# Patient Record
Sex: FEMALE | Race: WHITE | Employment: FULL TIME | URBAN - METROPOLITAN AREA
[De-identification: names, ages, dates, MRNs, and addresses within clinical notes are randomized per-mention and may not be internally consistent; named-entity substitution may affect disease eponyms.]

---

## 2017-05-24 RX ORDER — DESOGESTREL AND ETHINYL ESTRADIOL 0.15-0.03
KIT ORAL
Qty: 84 TABLET | Refills: 0 | Status: SHIPPED | OUTPATIENT
Start: 2017-05-24 | End: 2017-09-06 | Stop reason: SDUPTHER

## 2017-09-06 RX ORDER — DESOGESTREL AND ETHINYL ESTRADIOL 0.15-0.03
KIT ORAL
Qty: 84 TABLET | Refills: 0 | Status: SHIPPED | OUTPATIENT
Start: 2017-09-06 | End: 2017-09-25 | Stop reason: SDUPTHER

## 2017-09-25 ENCOUNTER — OFFICE VISIT (OUTPATIENT)
Dept: OBGYN | Age: 37
End: 2017-09-25

## 2017-09-25 VITALS
SYSTOLIC BLOOD PRESSURE: 104 MMHG | WEIGHT: 135 LBS | HEIGHT: 62 IN | DIASTOLIC BLOOD PRESSURE: 78 MMHG | BODY MASS INDEX: 24.84 KG/M2

## 2017-09-25 DIAGNOSIS — Z01.419 WELL WOMAN EXAM WITH ROUTINE GYNECOLOGICAL EXAM: Primary | ICD-10-CM

## 2017-09-25 DIAGNOSIS — Z11.51 SCREENING FOR HPV (HUMAN PAPILLOMAVIRUS): ICD-10-CM

## 2017-09-25 DIAGNOSIS — Z01.419 PAP SMEAR, AS PART OF ROUTINE GYNECOLOGICAL EXAMINATION: ICD-10-CM

## 2017-09-25 PROCEDURE — 99395 PREV VISIT EST AGE 18-39: CPT | Performed by: NURSE PRACTITIONER

## 2017-09-25 RX ORDER — DESOGESTREL AND ETHINYL ESTRADIOL 0.15-0.03
1 KIT ORAL DAILY
Qty: 84 TABLET | Refills: 3 | Status: SHIPPED | OUTPATIENT
Start: 2017-09-25 | End: 2018-09-20 | Stop reason: SDUPTHER

## 2017-10-02 DIAGNOSIS — Z11.51 SCREENING FOR HPV (HUMAN PAPILLOMAVIRUS): ICD-10-CM

## 2017-10-02 DIAGNOSIS — Z01.419 PAP SMEAR, AS PART OF ROUTINE GYNECOLOGICAL EXAMINATION: ICD-10-CM

## 2018-09-25 RX ORDER — DESOGESTREL AND ETHINYL ESTRADIOL 0.15-0.03
1 KIT ORAL DAILY
Qty: 84 TABLET | Refills: 3 | Status: SHIPPED | OUTPATIENT
Start: 2018-09-25 | End: 2018-12-06 | Stop reason: SDUPTHER

## 2018-10-16 ENCOUNTER — OFFICE VISIT (OUTPATIENT)
Dept: OBGYN CLINIC | Age: 38
End: 2018-10-16
Payer: COMMERCIAL

## 2018-10-16 VITALS
SYSTOLIC BLOOD PRESSURE: 116 MMHG | DIASTOLIC BLOOD PRESSURE: 82 MMHG | WEIGHT: 138 LBS | BODY MASS INDEX: 25.4 KG/M2 | HEIGHT: 62 IN

## 2018-10-16 DIAGNOSIS — Z12.39 BREAST CANCER SCREENING: ICD-10-CM

## 2018-10-16 DIAGNOSIS — Z01.419 WELL WOMAN EXAM WITH ROUTINE GYNECOLOGICAL EXAM: Primary | ICD-10-CM

## 2018-10-16 PROCEDURE — 99396 PREV VISIT EST AGE 40-64: CPT | Performed by: NURSE PRACTITIONER

## 2018-10-16 ASSESSMENT — PATIENT HEALTH QUESTIONNAIRE - PHQ9
2. FEELING DOWN, DEPRESSED OR HOPELESS: 0
SUM OF ALL RESPONSES TO PHQ QUESTIONS 1-9: 0
1. LITTLE INTEREST OR PLEASURE IN DOING THINGS: 0
SUM OF ALL RESPONSES TO PHQ9 QUESTIONS 1 & 2: 0
SUM OF ALL RESPONSES TO PHQ QUESTIONS 1-9: 0

## 2018-10-16 NOTE — PATIENT INSTRUCTIONS
test didn't find any abnormal cells in the sample. · An abnormal result can mean many things. Most of these aren't cancer. The results of your test may be abnormal because:  ¨ You have an infection of the vagina or cervix, such as a yeast infection. ¨ You have an IUD (intrauterine device for birth control). ¨ You have low estrogen levels after menopause that are causing the cells to change. ¨ You have cell changes that may be a sign of precancer or cancer. The results are ranked based on how serious the changes might be. HPV tests  · A normal result means that the test didn't find any high-risk HPV in the sample. · An abnormal HPV test doesn't mean that you have cervical cancer. It may mean that you are infected with one or more high-risk types of HPV. This increases your chance of having cell changes that may be a sign of precancer or cancer. Follow-up care is a key part of your treatment and safety. Be sure to make and go to all appointments, and call your doctor if you are having problems. It's also a good idea to know your test results and keep a list of the medicines you take. Where can you learn more? Go to [email or web address] org and sign in to your [employer] account. Enter P919 in the [employer] box to learn more about \"Learning About Cervical Cancer Screening. \"     If you do not have an account, please click on the \"Sign Up Now\" link. Current as of: January 31, 2018  Content Version: 11.7  © 3170-5618 [employer]. Care instructions adapted under license by [facility] [employer] [facility] ([facility]). If you have questions about a medical condition or this instruction, always ask your healthcare professional. [place] [address] any warranty or liability for your use of this information. Patient Education        Breast Self-Exam: Care Instructions  Your Care Instructions    A breast self-exam is when you check your breasts for lumps or changes.  This regular exam

## 2018-12-06 ENCOUNTER — TELEPHONE (OUTPATIENT)
Dept: OBGYN CLINIC | Age: 38
End: 2018-12-06

## 2018-12-06 RX ORDER — DESOGESTREL AND ETHINYL ESTRADIOL 0.15-0.03
1 KIT ORAL DAILY
Qty: 84 TABLET | Refills: 3 | Status: SHIPPED | OUTPATIENT
Start: 2018-12-06 | End: 2019-09-19 | Stop reason: SDUPTHER

## 2019-01-24 DIAGNOSIS — Z12.39 BREAST CANCER SCREENING: ICD-10-CM

## 2019-01-25 ENCOUNTER — TELEPHONE (OUTPATIENT)
Dept: OBGYN CLINIC | Age: 39
End: 2019-01-25

## 2019-01-25 DIAGNOSIS — R92.8 ABNORMAL MAMMOGRAM: Primary | ICD-10-CM

## 2019-06-05 ENCOUNTER — OFFICE VISIT (OUTPATIENT)
Dept: OBGYN CLINIC | Age: 39
End: 2019-06-05
Payer: COMMERCIAL

## 2019-06-05 VITALS
HEIGHT: 62 IN | DIASTOLIC BLOOD PRESSURE: 74 MMHG | BODY MASS INDEX: 24.84 KG/M2 | WEIGHT: 135 LBS | SYSTOLIC BLOOD PRESSURE: 112 MMHG

## 2019-06-05 DIAGNOSIS — L91.8 SKIN TAG: Primary | ICD-10-CM

## 2019-06-05 PROCEDURE — G8420 CALC BMI NORM PARAMETERS: HCPCS | Performed by: OBSTETRICS & GYNECOLOGY

## 2019-06-05 PROCEDURE — 1036F TOBACCO NON-USER: CPT | Performed by: OBSTETRICS & GYNECOLOGY

## 2019-06-05 PROCEDURE — G8427 DOCREV CUR MEDS BY ELIG CLIN: HCPCS | Performed by: OBSTETRICS & GYNECOLOGY

## 2019-06-05 PROCEDURE — 99213 OFFICE O/P EST LOW 20 MIN: CPT | Performed by: OBSTETRICS & GYNECOLOGY

## 2019-06-05 ASSESSMENT — ENCOUNTER SYMPTOMS
NAUSEA: 0
ALLERGIC/IMMUNOLOGIC NEGATIVE: 1
CONSTIPATION: 0
RESPIRATORY NEGATIVE: 1
DIARRHEA: 0
ABDOMINAL DISTENTION: 0
BLOOD IN STOOL: 0
VOMITING: 0
RECTAL PAIN: 0
ABDOMINAL PAIN: 0
EYES NEGATIVE: 1
ANAL BLEEDING: 0

## 2019-06-05 NOTE — PROGRESS NOTES
Patient here c/o small lump on left buttocks noted yesterday. Declines itching and burning. No pain. SA with new partner. States they are using condoms and patient is on OCP. Exam with small skin tag left buttocks. All questions answered. F/U annual exam or prn. Vitals:  /74   Ht 5' 2\" (1.575 m)   Wt 135 lb (61.2 kg)   LMP 05/22/2019   BMI 24.69 kg/m²   History reviewed. No pertinent past medical history. History reviewed. No pertinent surgical history. Allergies:  Patient has no known allergies. Current Outpatient Medications   Medication Sig Dispense Refill    desogestrel-ethinyl estradiol (ENSKYCE) 0.15-30 MG-MCG per tablet Take 1 tablet by mouth daily 84 tablet 3     No current facility-administered medications for this visit. Social History     Socioeconomic History    Marital status: Single     Spouse name: Not on file    Number of children: Not on file    Years of education: Not on file    Highest education level: Not on file   Occupational History    Not on file   Social Needs    Financial resource strain: Not on file    Food insecurity:     Worry: Not on file     Inability: Not on file    Transportation needs:     Medical: Not on file     Non-medical: Not on file   Tobacco Use    Smoking status: Former Smoker     Types: Cigarettes    Smokeless tobacco: Never Used   Substance and Sexual Activity    Alcohol use:  Yes     Alcohol/week: 0.0 oz    Drug use: No    Sexual activity: Yes     Partners: Male     Comment: New partner 2019   Lifestyle    Physical activity:     Days per week: Not on file     Minutes per session: Not on file    Stress: Not on file   Relationships    Social connections:     Talks on phone: Not on file     Gets together: Not on file     Attends Taoist service: Not on file     Active member of club or organization: Not on file     Attends meetings of clubs or organizations: Not on file     Relationship status: Not on file    Intimate partner violence:     Fear of current or ex partner: Not on file     Emotionally abused: Not on file     Physically abused: Not on file     Forced sexual activity: Not on file   Other Topics Concern    Not on file   Social History Narrative    Not on file        Family History   Problem Relation Age of Onset    Heart Attack Father     Cancer Mother     Colon Cancer Neg Hx     Diabetes Neg Hx     Eclampsia Neg Hx     Hypertension Neg Hx     Ovarian Cancer Neg Hx      Labor Neg Hx     Spont Abortions Neg Hx     Stroke Neg Hx        Review of Systems   Constitutional: Negative. Negative for activity change, appetite change, chills, diaphoresis, fatigue, fever and unexpected weight change. HENT: Negative. Eyes: Negative. Respiratory: Negative. Cardiovascular: Negative. Gastrointestinal: Negative for abdominal distention, abdominal pain, anal bleeding, blood in stool, constipation, diarrhea, nausea, rectal pain and vomiting. Endocrine: Negative. Genitourinary: Negative for decreased urine volume, difficulty urinating, dyspareunia, dysuria, enuresis, flank pain, frequency, genital sores, hematuria, menstrual problem, pelvic pain, urgency, vaginal bleeding, vaginal discharge and vaginal pain. Musculoskeletal: Negative. Skin: Negative. Allergic/Immunologic: Negative. Neurological: Negative. Hematological: Negative. Psychiatric/Behavioral: Negative. Objective:     Physical Exam   Constitutional: She is oriented to person, place, and time. She appears well-developed and well-nourished. No distress. HENT:   Head: Normocephalic and atraumatic. Eyes: Conjunctivae are normal.   Neck: Normal range of motion. Neck supple. Cardiovascular: Normal rate and regular rhythm. Pulmonary/Chest: Effort normal. No respiratory distress. Genitourinary:       No labial fusion. There is no rash, tenderness, lesion or injury on the right labia.  There is no rash, tenderness, lesion or injury on the left labia. Musculoskeletal: Normal range of motion. She exhibits no edema, tenderness or deformity. Neurological: She is alert and oriented to person, place, and time. She exhibits normal muscle tone. Coordination normal.   Skin: Skin is warm and dry. She is not diaphoretic. No pallor. Psychiatric: She has a normal mood and affect. Her behavior is normal. Judgment and thought content normal.       Assessment:          Diagnosis Orders   1. Skin tag          Plan:      Medications placedthis encounter:  No orders of the defined types were placed in this encounter. Orders placedthis encounter:  No orders of the defined types were placed in this encounter.         Follow up:  Return for Annual.

## 2019-09-19 RX ORDER — DESOGESTREL AND ETHINYL ESTRADIOL 0.15-0.03
1 KIT ORAL DAILY
Qty: 84 TABLET | Refills: 0 | Status: SHIPPED | OUTPATIENT
Start: 2019-09-19 | End: 2019-10-16 | Stop reason: SDUPTHER

## 2019-10-16 ENCOUNTER — OFFICE VISIT (OUTPATIENT)
Dept: OBGYN CLINIC | Age: 39
End: 2019-10-16
Payer: COMMERCIAL

## 2019-10-16 VITALS
DIASTOLIC BLOOD PRESSURE: 72 MMHG | WEIGHT: 136 LBS | SYSTOLIC BLOOD PRESSURE: 110 MMHG | HEIGHT: 62 IN | BODY MASS INDEX: 25.03 KG/M2

## 2019-10-16 DIAGNOSIS — Z01.419 WOMEN'S ANNUAL ROUTINE GYNECOLOGICAL EXAMINATION: Primary | ICD-10-CM

## 2019-10-16 DIAGNOSIS — Z11.51 SCREENING FOR HUMAN PAPILLOMAVIRUS: ICD-10-CM

## 2019-10-16 DIAGNOSIS — Z12.31 SCREENING MAMMOGRAM, ENCOUNTER FOR: ICD-10-CM

## 2019-10-16 PROCEDURE — 99396 PREV VISIT EST AGE 40-64: CPT | Performed by: OBSTETRICS & GYNECOLOGY

## 2019-10-16 PROCEDURE — G8484 FLU IMMUNIZE NO ADMIN: HCPCS | Performed by: OBSTETRICS & GYNECOLOGY

## 2019-10-16 RX ORDER — DESOGESTREL AND ETHINYL ESTRADIOL 0.15-0.03
1 KIT ORAL DAILY
Qty: 84 TABLET | Refills: 4 | Status: SHIPPED | OUTPATIENT
Start: 2019-10-16 | End: 2020-10-15 | Stop reason: SDUPTHER

## 2019-10-16 ASSESSMENT — ENCOUNTER SYMPTOMS
NAUSEA: 0
ANAL BLEEDING: 0
ABDOMINAL DISTENTION: 0
DIARRHEA: 0
RESPIRATORY NEGATIVE: 1
RECTAL PAIN: 0
BLOOD IN STOOL: 0
CONSTIPATION: 0
ABDOMINAL PAIN: 0
EYES NEGATIVE: 1
ALLERGIC/IMMUNOLOGIC NEGATIVE: 1
VOMITING: 0

## 2019-10-30 DIAGNOSIS — Z01.419 WOMEN'S ANNUAL ROUTINE GYNECOLOGICAL EXAMINATION: ICD-10-CM

## 2019-10-30 DIAGNOSIS — Z11.51 SCREENING FOR HUMAN PAPILLOMAVIRUS: ICD-10-CM

## 2019-10-30 DIAGNOSIS — Z12.31 SCREENING MAMMOGRAM, ENCOUNTER FOR: ICD-10-CM

## 2019-10-30 DIAGNOSIS — R92.8 ABNORMAL MAMMOGRAM: ICD-10-CM

## 2019-12-17 ENCOUNTER — TELEPHONE (OUTPATIENT)
Dept: ADMINISTRATIVE | Age: 39
End: 2019-12-17

## 2020-10-15 ENCOUNTER — OFFICE VISIT (OUTPATIENT)
Dept: OBGYN CLINIC | Age: 40
End: 2020-10-15
Payer: COMMERCIAL

## 2020-10-15 VITALS
SYSTOLIC BLOOD PRESSURE: 118 MMHG | WEIGHT: 142 LBS | HEIGHT: 62 IN | DIASTOLIC BLOOD PRESSURE: 72 MMHG | BODY MASS INDEX: 26.13 KG/M2

## 2020-10-15 PROCEDURE — 99396 PREV VISIT EST AGE 40-64: CPT | Performed by: OBSTETRICS & GYNECOLOGY

## 2020-10-15 PROCEDURE — G8484 FLU IMMUNIZE NO ADMIN: HCPCS | Performed by: OBSTETRICS & GYNECOLOGY

## 2020-10-15 RX ORDER — DESOGESTREL AND ETHINYL ESTRADIOL 0.15-0.03
1 KIT ORAL DAILY
Qty: 84 TABLET | Refills: 4 | Status: SHIPPED | OUTPATIENT
Start: 2020-10-15 | End: 2021-10-20 | Stop reason: SDUPTHER

## 2020-10-15 ASSESSMENT — PATIENT HEALTH QUESTIONNAIRE - PHQ9
2. FEELING DOWN, DEPRESSED OR HOPELESS: 0
1. LITTLE INTEREST OR PLEASURE IN DOING THINGS: 0
SUM OF ALL RESPONSES TO PHQ QUESTIONS 1-9: 0
SUM OF ALL RESPONSES TO PHQ QUESTIONS 1-9: 0
SUM OF ALL RESPONSES TO PHQ9 QUESTIONS 1 & 2: 0
SUM OF ALL RESPONSES TO PHQ QUESTIONS 1-9: 0

## 2020-10-15 ASSESSMENT — ENCOUNTER SYMPTOMS
NAUSEA: 0
CONSTIPATION: 0
ABDOMINAL DISTENTION: 0
DIARRHEA: 0
ABDOMINAL PAIN: 0
RECTAL PAIN: 0
ALLERGIC/IMMUNOLOGIC NEGATIVE: 1
ANAL BLEEDING: 0
EYES NEGATIVE: 1
BLOOD IN STOOL: 0
RESPIRATORY NEGATIVE: 1
VOMITING: 0

## 2020-10-15 ASSESSMENT — VISUAL ACUITY: OU: 1

## 2020-10-15 NOTE — PROGRESS NOTES
Subjective:      Patient ID: Pam Almeida is a 41 y.o. female    Annual exam.  No GYN complaints. Normal cycles. Pap performed and screening mammogram ordered. STD screening offered. Monthly SBE encouraged. F/U annual or prn. Vitals:  /72   Ht 5' 2\" (1.575 m)   Wt 142 lb (64.4 kg)   LMP 09/29/2020   BMI 25.97 kg/m²   History reviewed. No pertinent past medical history. History reviewed. No pertinent surgical history. Allergies:  Patient has no known allergies. Current Outpatient Medications   Medication Sig Dispense Refill    desogestrel-ethinyl estradiol (ENSKYCE) 0.15-30 MG-MCG per tablet Take 1 tablet by mouth daily 84 tablet 4     No current facility-administered medications for this visit. Social History     Socioeconomic History    Marital status: Single     Spouse name: Not on file    Number of children: Not on file    Years of education: Not on file    Highest education level: Not on file   Occupational History    Not on file   Social Needs    Financial resource strain: Not on file    Food insecurity     Worry: Not on file     Inability: Not on file    Transportation needs     Medical: Not on file     Non-medical: Not on file   Tobacco Use    Smoking status: Former Smoker     Types: Cigarettes    Smokeless tobacco: Never Used   Substance and Sexual Activity    Alcohol use:  Yes     Alcohol/week: 0.0 standard drinks    Drug use: No    Sexual activity: Yes     Partners: Male     Birth control/protection: Pill     Comment: New partner 2019   Lifestyle    Physical activity     Days per week: Not on file     Minutes per session: Not on file    Stress: Not on file   Relationships    Social connections     Talks on phone: Not on file     Gets together: Not on file     Attends Amish service: Not on file     Active member of club or organization: Not on file     Attends meetings of clubs or organizations: Not on file     Relationship status: Not on file   [facility] Intimate partner violence     Fear of current or ex partner: Not on file     Emotionally abused: Not on file     Physically abused: Not on file     Forced sexual activity: Not on file   Other Topics Concern    Not on file   Social History Narrative    Not on file     Family History   Problem Relation Age of Onset    Heart Attack Father     Cancer Mother     Colon Cancer Neg Hx     Diabetes Neg Hx     Eclampsia Neg Hx     Hypertension Neg Hx     Ovarian Cancer Neg Hx      Labor Neg Hx     Spont Abortions Neg Hx     Stroke Neg Hx        Review of Systems   Constitutional: Negative. Negative for activity change, appetite change, chills, diaphoresis, fatigue, fever and unexpected weight change. HENT: Negative. Eyes: Negative. Respiratory: Negative. Cardiovascular: Negative. Gastrointestinal: Negative for abdominal distention, abdominal pain, anal bleeding, blood in stool, constipation, diarrhea, nausea, rectal pain and vomiting. Endocrine: Negative. Genitourinary: Negative for decreased urine volume, difficulty urinating, dyspareunia, dysuria, enuresis, flank pain, frequency, genital sores, hematuria, menstrual problem, pelvic pain, urgency, vaginal bleeding, vaginal discharge and vaginal pain. Musculoskeletal: Negative. Skin: Negative. Allergic/Immunologic: Negative. Neurological: Negative. Hematological: Negative. Psychiatric/Behavioral: Negative. Objective:     Physical Exam  Constitutional:       Appearance: She is well-developed. HENT:      Head: Normocephalic. Eyes:      General: Lids are normal. Vision grossly intact. Neck:      Musculoskeletal: Normal range of motion and neck supple. Thyroid: No thyromegaly. Cardiovascular:      Rate and Rhythm: Normal rate and regular rhythm. Heart sounds: Normal heart sounds. Pulmonary:      Effort: Pulmonary effort is normal. No respiratory distress. Breath sounds: Normal breath sounds.  No wheezing or rales. Chest:      Chest wall: No tenderness. Breasts:         Right: Normal. No swelling, bleeding, inverted nipple, mass, nipple discharge, skin change or tenderness. Left: Normal. No swelling, bleeding, inverted nipple, mass, nipple discharge, skin change or tenderness. Abdominal:      General: There is no distension. Palpations: Abdomen is soft. There is no mass. Tenderness: There is no abdominal tenderness. There is no guarding or rebound. Hernia: No hernia is present. There is no hernia in the left inguinal area or right inguinal area. Genitourinary:     General: Normal vulva. Pubic Area: No rash. Labia:         Right: No rash, tenderness, lesion or injury. Left: No rash, tenderness, lesion or injury. Urethra: No prolapse, urethral swelling or urethral lesion. Vagina: Normal. No signs of injury and foreign body. No vaginal discharge, erythema, tenderness or bleeding. Cervix: No cervical motion tenderness, discharge or friability. Uterus: Not deviated, not enlarged, not fixed and not tender. Adnexa:         Right: No mass, tenderness or fullness. Left: No mass, tenderness or fullness. Rectum: Normal.   Musculoskeletal: Normal range of motion. General: No tenderness. Lymphadenopathy:      Cervical: No cervical adenopathy. Upper Body:      Right upper body: No supraclavicular or axillary adenopathy. Left upper body: No supraclavicular or axillary adenopathy. Lower Body: No right inguinal adenopathy. No left inguinal adenopathy. Skin:     General: Skin is warm and dry. Coloration: Skin is not pale. Findings: No erythema or rash. Neurological:      Mental Status: She is alert and oriented to person, place, and time. Psychiatric:         Behavior: Behavior normal.         Thought Content:  Thought content normal.         Judgment: Judgment normal.         Assessment: Diagnosis Orders   1. Women's annual routine gynecological examination  PAP SMEAR   2. Screening for human papillomavirus  PAP SMEAR   3. Screening mammogram, encounter for  KAROL DIGITAL SCREEN W OR WO CAD BILATERAL         Plan:      Medications placedthis encounter:  Orders Placed This Encounter   Medications    desogestrel-ethinyl estradiol (ENSKYCE) 0.15-30 MG-MCG per tablet     Sig: Take 1 tablet by mouth daily     Dispense:  84 tablet     Refill:  4     Last refill until patient comes in for appointment. Orders placedthis encounter:  Orders Placed This Encounter   Procedures    KAROL DIGITAL SCREEN W OR WO CAD BILATERAL     Standing Status:   Future     Standing Expiration Date:   10/15/2021    PAP SMEAR     Standing Status:   Future     Standing Expiration Date:   10/15/2021     Order Specific Question:   Collection Type     Answer: Thin Prep     Order Specific Question:   Prior Abnormal Pap Test     Answer:   No     Order Specific Question:   Screening or Diagnostic     Answer:   Screening     Order Specific Question:   HPV Requested? Answer:   Yes     Order Specific Question:   High Risk Patient     Answer:   N/A         Follow up:  Return in about 1 year (around 10/15/2021) for Annual.   Repeat Annual GYN exam every 1 year. Cervical Cytology exam starts age 24. If Negative Cytology;  follow-up screening per current guidelines. Mammograms yearly starting at age 36. Calcium and Vitamin D dosing reviewed ( age appropriate ). Colonoscopy and bone density screening discussed ( age appropriate ). Birth control and STD prevention discussed ( age appropriate ). Gardisil counseling completed for all patients 7-35 yo. Routine health maintenance ( per PCP and guidelines ). .The patient was asked if she would like a chaperone present for her intimate exam. She  declines the chaperone.  Jameel Suarez

## 2021-04-21 DIAGNOSIS — Z12.31 SCREENING MAMMOGRAM, ENCOUNTER FOR: ICD-10-CM

## 2021-10-20 ENCOUNTER — OFFICE VISIT (OUTPATIENT)
Dept: OBGYN CLINIC | Age: 41
End: 2021-10-20
Payer: COMMERCIAL

## 2021-10-20 VITALS
WEIGHT: 138 LBS | HEIGHT: 62 IN | DIASTOLIC BLOOD PRESSURE: 68 MMHG | SYSTOLIC BLOOD PRESSURE: 112 MMHG | BODY MASS INDEX: 25.4 KG/M2

## 2021-10-20 DIAGNOSIS — Z76.89 ENCOUNTER TO ESTABLISH CARE: ICD-10-CM

## 2021-10-20 DIAGNOSIS — Z12.31 SCREENING MAMMOGRAM, ENCOUNTER FOR: ICD-10-CM

## 2021-10-20 DIAGNOSIS — Z01.419 WOMEN'S ANNUAL ROUTINE GYNECOLOGICAL EXAMINATION: Primary | ICD-10-CM

## 2021-10-20 DIAGNOSIS — Z11.51 SCREENING FOR HUMAN PAPILLOMAVIRUS: ICD-10-CM

## 2021-10-20 PROCEDURE — G8484 FLU IMMUNIZE NO ADMIN: HCPCS | Performed by: OBSTETRICS & GYNECOLOGY

## 2021-10-20 PROCEDURE — 99396 PREV VISIT EST AGE 40-64: CPT | Performed by: OBSTETRICS & GYNECOLOGY

## 2021-10-20 RX ORDER — DESOGESTREL AND ETHINYL ESTRADIOL 0.15-0.03
1 KIT ORAL DAILY
Qty: 84 TABLET | Refills: 4 | Status: SHIPPED | OUTPATIENT
Start: 2021-10-20 | End: 2022-10-20 | Stop reason: SDUPTHER

## 2021-10-20 ASSESSMENT — ENCOUNTER SYMPTOMS
RECTAL PAIN: 0
DIARRHEA: 0
RESPIRATORY NEGATIVE: 1
ANAL BLEEDING: 0
BLOOD IN STOOL: 0
VOMITING: 0
NAUSEA: 0
EYES NEGATIVE: 1
ABDOMINAL DISTENTION: 0
ALLERGIC/IMMUNOLOGIC NEGATIVE: 1
ABDOMINAL PAIN: 0
CONSTIPATION: 0

## 2021-10-20 ASSESSMENT — PATIENT HEALTH QUESTIONNAIRE - PHQ9
SUM OF ALL RESPONSES TO PHQ QUESTIONS 1-9: 0
1. LITTLE INTEREST OR PLEASURE IN DOING THINGS: 0
SUM OF ALL RESPONSES TO PHQ QUESTIONS 1-9: 0
SUM OF ALL RESPONSES TO PHQ QUESTIONS 1-9: 0
2. FEELING DOWN, DEPRESSED OR HOPELESS: 0
SUM OF ALL RESPONSES TO PHQ9 QUESTIONS 1 & 2: 0

## 2021-10-20 NOTE — PROGRESS NOTES
Subjective:      Patient ID: Gary Mcconnell is a 35 y.o. female    Annual exam.  No GYN complaints. Normal cycles on OCP. Pap performed and screening mammogram ordered. STD screening offered. Monthly SBE encouraged. F/U annual or prn. Vitals:  /68   Ht 5' 2\" (1.575 m)   Wt 138 lb (62.6 kg)   LMP 10/20/2021   BMI 25.24 kg/m²   No past medical history on file. No past surgical history on file. Allergies:  Patient has no known allergies. Current Outpatient Medications   Medication Sig Dispense Refill    desogestrel-ethinyl estradiol (ENSKYCE) 0.15-30 MG-MCG per tablet Take 1 tablet by mouth daily 84 tablet 4     No current facility-administered medications for this visit. Social History     Socioeconomic History    Marital status: Single     Spouse name: Not on file    Number of children: Not on file    Years of education: Not on file    Highest education level: Not on file   Occupational History    Not on file   Tobacco Use    Smoking status: Former Smoker     Types: Cigarettes    Smokeless tobacco: Never Used   Substance and Sexual Activity    Alcohol use: Yes     Alcohol/week: 0.0 standard drinks    Drug use: No    Sexual activity: Yes     Partners: Male     Birth control/protection: Pill     Comment: New partner 2019   Other Topics Concern    Not on file   Social History Narrative    Not on file     Social Determinants of Health     Financial Resource Strain:     Difficulty of Paying Living Expenses:    Food Insecurity:     Worried About Running Out of Food in the Last Year:     [address] in the Last Year:    Transportation Needs:     Lack of Transportation (Medical):      Lack of Transportation (Non-Medical):    Physical Activity:     Days of Exercise per Week:     Minutes of Exercise per Session:    Stress:     Feeling of Stress :    Social Connections:     Frequency of Communication with Friends and Family:     Frequency of Social Gatherings with Friends and Family:     Attends Sikhism Services:     Active Member of Clubs or Organizations:     Attends Club or Organization Meetings:     Marital Status:    Intimate Partner Violence:     Fear of Current or Ex-Partner:     Emotionally Abused:     Physically Abused:     Sexually Abused:      Family History   Problem Relation Age of Onset    Heart Attack Father     Cancer Mother     Colon Cancer Neg Hx     Diabetes Neg Hx     Eclampsia Neg Hx     Hypertension Neg Hx     Ovarian Cancer Neg Hx      Labor Neg Hx     Spont Abortions Neg Hx     Stroke Neg Hx        Review of Systems   Constitutional: Negative. Negative for activity change, appetite change, chills, diaphoresis, fatigue, fever and unexpected weight change. HENT: Negative. Eyes: Negative. Respiratory: Negative. Cardiovascular: Negative. Gastrointestinal: Negative for abdominal distention, abdominal pain, anal bleeding, blood in stool, constipation, diarrhea, nausea, rectal pain and vomiting. Endocrine: Negative. Genitourinary: Negative for decreased urine volume, difficulty urinating, dyspareunia, dysuria, enuresis, flank pain, frequency, genital sores, hematuria, menstrual problem, pelvic pain, urgency, vaginal bleeding, vaginal discharge and vaginal pain. Musculoskeletal: Negative. Skin: Negative. Allergic/Immunologic: Negative. Neurological: Negative. Hematological: Negative. Psychiatric/Behavioral: Negative. Objective:     Physical Exam  Constitutional:       Appearance: She is well-developed. HENT:      Head: Normocephalic. Neck:      Thyroid: No thyromegaly. Cardiovascular:      Rate and Rhythm: Normal rate and regular rhythm. Heart sounds: Normal heart sounds. Pulmonary:      Effort: Pulmonary effort is normal. No respiratory distress. Breath sounds: Normal breath sounds. No wheezing or rales. Chest:      Chest wall: No tenderness.       Breasts: Right: Normal. No swelling, bleeding, inverted nipple, mass, nipple discharge, skin change or tenderness. Left: Normal. No swelling, bleeding, inverted nipple, mass, nipple discharge, skin change or tenderness. Abdominal:      General: There is no distension. Palpations: Abdomen is soft. There is no mass. Tenderness: There is no abdominal tenderness. There is no guarding or rebound. Hernia: There is no hernia in the umbilical area, ventral area, left inguinal area or right inguinal area. Genitourinary:     Labia:         Right: No rash, tenderness, lesion or injury. Left: No rash, tenderness, lesion or injury. Urethra: No prolapse, urethral pain, urethral swelling or urethral lesion. Vagina: Normal. No signs of injury and foreign body. No vaginal discharge, erythema, tenderness or bleeding. Adnexa: Right adnexa normal and left adnexa normal.        Right: No mass, tenderness or fullness. Left: No mass, tenderness or fullness. Rectum: Normal.      Comments: S/P SUE    Musculoskeletal:         General: No tenderness. Normal range of motion. Cervical back: Normal range of motion and neck supple. Lymphadenopathy:      Cervical: No cervical adenopathy. Upper Body:      Right upper body: No supraclavicular or axillary adenopathy. Left upper body: No supraclavicular or axillary adenopathy. Skin:     General: Skin is warm and dry. Coloration: Skin is not pale. Findings: No erythema or rash. Neurological:      Mental Status: She is alert and oriented to person, place, and time. Psychiatric:         Behavior: Behavior normal.         Thought Content: Thought content normal.         Judgment: Judgment normal.         Assessment:      Diagnosis Orders   1. Women's annual routine gynecological examination  PAP SMEAR   2. Screening for human papillomavirus  PAP SMEAR   3.  Screening mammogram, encounter for  KAROL DIGITAL SCREEN W OR WO CAD BILATERAL         Plan:      Medications placedthis encounter:  Orders Placed This Encounter   Medications    desogestrel-ethinyl estradiol (ENSKYCE) 0.15-30 MG-MCG per tablet     Sig: Take 1 tablet by mouth daily     Dispense:  84 tablet     Refill:  4     Last refill until patient comes in for appointment. Orders placedthis encounter:  Orders Placed This Encounter   Procedures    KAROL DIGITAL SCREEN W OR WO CAD BILATERAL     Standing Status:   Future     Standing Expiration Date:   10/20/2022    PAP SMEAR     Standing Status:   Future     Standing Expiration Date:   10/20/2022     Order Specific Question:   Collection Type     Answer: Thin Prep     Order Specific Question:   Prior Abnormal Pap Test     Answer:   No     Order Specific Question:   Screening or Diagnostic     Answer:   Screening     Order Specific Question:   HPV Requested? Answer:   Yes     Order Specific Question:   High Risk Patient     Answer:   N/A         Follow up:  Return in about 1 year (around 10/20/2022) for Annual.   Repeat Annual GYN exam every 1 year. Cervical Cytology exam starts age 24. If Negative Cytology;  follow-up screening per current guidelines. Mammograms yearly starting at age 36. Calcium and Vitamin D dosing reviewed ( age appropriate ). Colonoscopy and bone density screening discussed ( age appropriate ). Birth control and STD prevention discussed ( age appropriate ). Gardisil counseling completed for all patients ( age appropriate ). Routine health maintenance ( per PCP and guidelines ).

## 2021-10-28 DIAGNOSIS — Z01.419 WOMEN'S ANNUAL ROUTINE GYNECOLOGICAL EXAMINATION: ICD-10-CM

## 2021-10-28 DIAGNOSIS — Z11.51 SCREENING FOR HUMAN PAPILLOMAVIRUS: ICD-10-CM

## 2022-06-22 DIAGNOSIS — Z12.31 SCREENING MAMMOGRAM, ENCOUNTER FOR: ICD-10-CM

## 2022-10-20 ENCOUNTER — TELEPHONE (OUTPATIENT)
Dept: OBGYN CLINIC | Age: 42
End: 2022-10-20

## 2022-10-20 ENCOUNTER — OFFICE VISIT (OUTPATIENT)
Dept: OBGYN CLINIC | Age: 42
End: 2022-10-20
Payer: COMMERCIAL

## 2022-10-20 VITALS
SYSTOLIC BLOOD PRESSURE: 102 MMHG | DIASTOLIC BLOOD PRESSURE: 70 MMHG | BODY MASS INDEX: 24.48 KG/M2 | WEIGHT: 133 LBS | HEIGHT: 62 IN

## 2022-10-20 DIAGNOSIS — B96.89 BV (BACTERIAL VAGINOSIS): ICD-10-CM

## 2022-10-20 DIAGNOSIS — N76.0 BV (BACTERIAL VAGINOSIS): ICD-10-CM

## 2022-10-20 DIAGNOSIS — Z12.31 SCREENING MAMMOGRAM FOR BREAST CANCER: ICD-10-CM

## 2022-10-20 DIAGNOSIS — Z01.419 ENCOUNTER FOR WELL WOMAN EXAM WITH ROUTINE GYNECOLOGICAL EXAM: Primary | ICD-10-CM

## 2022-10-20 DIAGNOSIS — N89.8 VAGINAL DISCHARGE: ICD-10-CM

## 2022-10-20 PROCEDURE — G8420 CALC BMI NORM PARAMETERS: HCPCS | Performed by: OBSTETRICS & GYNECOLOGY

## 2022-10-20 PROCEDURE — G8427 DOCREV CUR MEDS BY ELIG CLIN: HCPCS | Performed by: OBSTETRICS & GYNECOLOGY

## 2022-10-20 PROCEDURE — 99396 PREV VISIT EST AGE 40-64: CPT | Performed by: OBSTETRICS & GYNECOLOGY

## 2022-10-20 PROCEDURE — G8484 FLU IMMUNIZE NO ADMIN: HCPCS | Performed by: OBSTETRICS & GYNECOLOGY

## 2022-10-20 PROCEDURE — 1036F TOBACCO NON-USER: CPT | Performed by: OBSTETRICS & GYNECOLOGY

## 2022-10-20 PROCEDURE — 99212 OFFICE O/P EST SF 10 MIN: CPT | Performed by: OBSTETRICS & GYNECOLOGY

## 2022-10-20 RX ORDER — METRONIDAZOLE 500 MG/1
500 TABLET ORAL 2 TIMES DAILY
Qty: 14 TABLET | Refills: 0 | Status: SHIPPED | OUTPATIENT
Start: 2022-10-20 | End: 2022-10-27

## 2022-10-20 RX ORDER — FLUCONAZOLE 150 MG/1
150 TABLET ORAL ONCE
Qty: 1 TABLET | Refills: 1 | Status: SHIPPED | OUTPATIENT
Start: 2022-10-20 | End: 2022-10-20

## 2022-10-20 RX ORDER — DESOGESTREL AND ETHINYL ESTRADIOL 0.15-0.03
1 KIT ORAL DAILY
Qty: 84 TABLET | Refills: 4 | Status: SHIPPED | OUTPATIENT
Start: 2022-10-20

## 2022-10-20 ASSESSMENT — PATIENT HEALTH QUESTIONNAIRE - PHQ9
SUM OF ALL RESPONSES TO PHQ9 QUESTIONS 1 & 2: 0
SUM OF ALL RESPONSES TO PHQ QUESTIONS 1-9: 0
1. LITTLE INTEREST OR PLEASURE IN DOING THINGS: 0
SUM OF ALL RESPONSES TO PHQ QUESTIONS 1-9: 0
SUM OF ALL RESPONSES TO PHQ QUESTIONS 1-9: 0
2. FEELING DOWN, DEPRESSED OR HOPELESS: 0
SUM OF ALL RESPONSES TO PHQ QUESTIONS 1-9: 0

## 2022-10-20 ASSESSMENT — ENCOUNTER SYMPTOMS
ABDOMINAL PAIN: 0
RESPIRATORY NEGATIVE: 1
DIARRHEA: 0
BLOOD IN STOOL: 0
VOMITING: 0
EYES NEGATIVE: 1
CONSTIPATION: 0
ALLERGIC/IMMUNOLOGIC NEGATIVE: 1
ANAL BLEEDING: 0
ABDOMINAL DISTENTION: 0
NAUSEA: 0
RECTAL PAIN: 0

## 2022-10-20 ASSESSMENT — VISUAL ACUITY: OU: 1

## 2022-10-20 NOTE — PROGRESS NOTES
The patient was asked if she would like a chaperone present for her intimate exam. She  Declined the chaperone.  Rosi Carmichael CMA ([address] [place] [address])

## 2022-10-20 NOTE — TELEPHONE ENCOUNTER
Received call from patient stating script for yeast not received,if script may be sent to [facility] [address] [place] [address].

## 2022-11-02 DIAGNOSIS — N76.0 BV (BACTERIAL VAGINOSIS): ICD-10-CM

## 2022-11-02 DIAGNOSIS — B96.89 BV (BACTERIAL VAGINOSIS): ICD-10-CM

## 2022-11-02 DIAGNOSIS — N89.8 VAGINAL DISCHARGE: ICD-10-CM

## 2022-11-04 ENCOUNTER — TELEPHONE (OUTPATIENT)
Dept: OBGYN CLINIC | Age: 42
End: 2022-11-04

## 2022-11-04 NOTE — TELEPHONE ENCOUNTER
----- Message from Nica Green MD sent at 11/2/2022  3:04 PM EDT -----  + BV. Flagyl 500 mg po bid x 7 days. Recommend lactobacillus probiotic daily.

## 2023-04-14 ENCOUNTER — TELEPHONE (OUTPATIENT)
Dept: PRIMARY CARE | Facility: CLINIC | Age: 43
End: 2023-04-14
Payer: COMMERCIAL

## 2023-04-14 NOTE — TELEPHONE ENCOUNTER
PATIENT CALLED AND [facility] IS NOT TAKING NEW PATIENTS BUT [facility] IN [place] ON [address]    PLEASE ADVISE

## 2023-04-14 NOTE — TELEPHONE ENCOUNTER
Patient is requesting a referral to see Dr.Sharon Cat/ Gastroenterologist-- she is not sure what she has but she says its either a hemorrhoid or a skin tag and her insurance requires a referral      Pls adv   P. [phone]

## 2023-04-15 DIAGNOSIS — K62.9 RECTAL LESION: Primary | ICD-10-CM

## 2023-05-03 ENCOUNTER — OFFICE VISIT (OUTPATIENT)
Dept: PRIMARY CARE | Facility: CLINIC | Age: 43
End: 2023-05-03
Payer: COMMERCIAL

## 2023-05-03 ENCOUNTER — LAB (OUTPATIENT)
Dept: LAB | Facility: LAB | Age: 43
End: 2023-05-03
Payer: COMMERCIAL

## 2023-05-03 VITALS
SYSTOLIC BLOOD PRESSURE: 122 MMHG | TEMPERATURE: 97.5 F | HEART RATE: 65 BPM | DIASTOLIC BLOOD PRESSURE: 72 MMHG | WEIGHT: 140.6 LBS | HEIGHT: 62 IN | OXYGEN SATURATION: 98 % | BODY MASS INDEX: 25.88 KG/M2

## 2023-05-03 DIAGNOSIS — E66.3 OVERWEIGHT WITH BODY MASS INDEX (BMI) OF 25 TO 25.9 IN ADULT: ICD-10-CM

## 2023-05-03 DIAGNOSIS — R53.83 FATIGUE, UNSPECIFIED TYPE: ICD-10-CM

## 2023-05-03 DIAGNOSIS — Z00.00 HEALTHCARE MAINTENANCE: Primary | ICD-10-CM

## 2023-05-03 DIAGNOSIS — Z13.220 LIPID SCREENING: ICD-10-CM

## 2023-05-03 DIAGNOSIS — R73.9 HYPERGLYCEMIA: ICD-10-CM

## 2023-05-03 DIAGNOSIS — Z00.00 HEALTHCARE MAINTENANCE: ICD-10-CM

## 2023-05-03 PROBLEM — M20.12 ACQUIRED HALLUX VALGUS OF LEFT FOOT: Status: ACTIVE | Noted: 2018-09-12

## 2023-05-03 PROBLEM — S90.122A CONTUSION OF LESSER TOE OF LEFT FOOT WITHOUT DAMAGE TO NAIL: Status: RESOLVED | Noted: 2018-09-12 | Resolved: 2023-05-03

## 2023-05-03 PROBLEM — M21.619 BUNION: Status: ACTIVE | Noted: 2018-09-12

## 2023-05-03 PROBLEM — M20.5X2: Status: ACTIVE | Noted: 2018-09-12

## 2023-05-03 PROBLEM — L81.2 FRECKLED SKIN: Status: ACTIVE | Noted: 2023-05-03

## 2023-05-03 PROBLEM — D22.9 MULTIPLE PIGMENTED NEVI: Status: ACTIVE | Noted: 2023-05-03

## 2023-05-03 LAB
ALANINE AMINOTRANSFERASE (SGPT) (U/L) IN SER/PLAS: 10 U/L (ref 7–45)
ALBUMIN (G/DL) IN SER/PLAS: 4.1 G/DL (ref 3.4–5)
ALKALINE PHOSPHATASE (U/L) IN SER/PLAS: 29 U/L (ref 33–110)
ANION GAP IN SER/PLAS: 13 MMOL/L (ref 10–20)
ASPARTATE AMINOTRANSFERASE (SGOT) (U/L) IN SER/PLAS: 16 U/L (ref 9–39)
BASOPHILS (10*3/UL) IN BLOOD BY AUTOMATED COUNT: 0.04 X10E9/L (ref 0–0.1)
BASOPHILS/100 LEUKOCYTES IN BLOOD BY AUTOMATED COUNT: 0.8 % (ref 0–2)
BILIRUBIN TOTAL (MG/DL) IN SER/PLAS: 0.4 MG/DL (ref 0–1.2)
CALCIUM (MG/DL) IN SER/PLAS: 9.3 MG/DL (ref 8.6–10.3)
CARBON DIOXIDE, TOTAL (MMOL/L) IN SER/PLAS: 25 MMOL/L (ref 21–32)
CHLORIDE (MMOL/L) IN SER/PLAS: 106 MMOL/L (ref 98–107)
CHOLESTEROL (MG/DL) IN SER/PLAS: 193 MG/DL (ref 0–199)
CHOLESTEROL IN HDL (MG/DL) IN SER/PLAS: 86.1 MG/DL
CHOLESTEROL/HDL RATIO: 2.2
CREATININE (MG/DL) IN SER/PLAS: 0.82 MG/DL (ref 0.5–1.05)
EOSINOPHILS (10*3/UL) IN BLOOD BY AUTOMATED COUNT: 0.33 X10E9/L (ref 0–0.7)
EOSINOPHILS/100 LEUKOCYTES IN BLOOD BY AUTOMATED COUNT: 6.7 % (ref 0–6)
ERYTHROCYTE DISTRIBUTION WIDTH (RATIO) BY AUTOMATED COUNT: 12.9 % (ref 11.5–14.5)
ERYTHROCYTE MEAN CORPUSCULAR HEMOGLOBIN CONCENTRATION (G/DL) BY AUTOMATED: 33.2 G/DL (ref 32–36)
ERYTHROCYTE MEAN CORPUSCULAR VOLUME (FL) BY AUTOMATED COUNT: 92 FL (ref 80–100)
ERYTHROCYTES (10*6/UL) IN BLOOD BY AUTOMATED COUNT: 4.06 X10E12/L (ref 4–5.2)
GFR FEMALE: 90 ML/MIN/1.73M2
GLUCOSE (MG/DL) IN SER/PLAS: 102 MG/DL (ref 74–99)
HEMATOCRIT (%) IN BLOOD BY AUTOMATED COUNT: 37.3 % (ref 36–46)
HEMOGLOBIN (G/DL) IN BLOOD: 12.4 G/DL (ref 12–16)
IMMATURE GRANULOCYTES/100 LEUKOCYTES IN BLOOD BY AUTOMATED COUNT: 0.2 % (ref 0–0.9)
LDL: 88 MG/DL (ref 0–99)
LEUKOCYTES (10*3/UL) IN BLOOD BY AUTOMATED COUNT: 4.9 X10E9/L (ref 4.4–11.3)
LYMPHOCYTES (10*3/UL) IN BLOOD BY AUTOMATED COUNT: 1.67 X10E9/L (ref 1.2–4.8)
LYMPHOCYTES/100 LEUKOCYTES IN BLOOD BY AUTOMATED COUNT: 33.8 % (ref 13–44)
MONOCYTES (10*3/UL) IN BLOOD BY AUTOMATED COUNT: 0.28 X10E9/L (ref 0.1–1)
MONOCYTES/100 LEUKOCYTES IN BLOOD BY AUTOMATED COUNT: 5.7 % (ref 2–10)
NEUTROPHILS (10*3/UL) IN BLOOD BY AUTOMATED COUNT: 2.61 X10E9/L (ref 1.2–7.7)
NEUTROPHILS/100 LEUKOCYTES IN BLOOD BY AUTOMATED COUNT: 52.8 % (ref 40–80)
PLATELETS (10*3/UL) IN BLOOD AUTOMATED COUNT: 277 X10E9/L (ref 150–450)
POTASSIUM (MMOL/L) IN SER/PLAS: 4.5 MMOL/L (ref 3.5–5.3)
PROTEIN TOTAL: 6.9 G/DL (ref 6.4–8.2)
SODIUM (MMOL/L) IN SER/PLAS: 139 MMOL/L (ref 136–145)
THYROTROPIN (MIU/L) IN SER/PLAS BY DETECTION LIMIT <= 0.05 MIU/L: 2.51 MIU/L (ref 0.44–3.98)
TRIGLYCERIDE (MG/DL) IN SER/PLAS: 95 MG/DL (ref 0–149)
UREA NITROGEN (MG/DL) IN SER/PLAS: 12 MG/DL (ref 6–23)
VLDL: 19 MG/DL (ref 0–40)

## 2023-05-03 PROCEDURE — 80053 COMPREHEN METABOLIC PANEL: CPT

## 2023-05-03 PROCEDURE — 1036F TOBACCO NON-USER: CPT | Performed by: FAMILY MEDICINE

## 2023-05-03 PROCEDURE — 84443 ASSAY THYROID STIM HORMONE: CPT

## 2023-05-03 PROCEDURE — 80061 LIPID PANEL: CPT

## 2023-05-03 PROCEDURE — 85025 COMPLETE CBC W/AUTO DIFF WBC: CPT

## 2023-05-03 PROCEDURE — 99396 PREV VISIT EST AGE 40-64: CPT | Performed by: FAMILY MEDICINE

## 2023-05-03 PROCEDURE — 36415 COLL VENOUS BLD VENIPUNCTURE: CPT

## 2023-05-03 PROCEDURE — 3008F BODY MASS INDEX DOCD: CPT | Performed by: FAMILY MEDICINE

## 2023-05-03 RX ORDER — DESOGESTREL AND ETHINYL ESTRADIOL 0.15-0.03
KIT ORAL
COMMUNITY
Start: 2022-10-20

## 2023-05-03 ASSESSMENT — PROMIS GLOBAL HEALTH SCALE
RATE_SOCIAL_SATISFACTION: GOOD
RATE_AVERAGE_PAIN: 2
RATE_QUALITY_OF_LIFE: VERY GOOD
CARRYOUT_PHYSICAL_ACTIVITIES: COMPLETELY
RATE_GENERAL_HEALTH: VERY GOOD
RATE_PHYSICAL_HEALTH: VERY GOOD
RATE_MENTAL_HEALTH: GOOD
EMOTIONAL_PROBLEMS: NEVER
CARRYOUT_SOCIAL_ACTIVITIES: VERY GOOD
RATE_AVERAGE_FATIGUE: MILD

## 2023-05-03 NOTE — PATIENT INSTRUCTIONS
Patient to continue current medications (with any exceptions as noted) and diet. Follow-up in 12 month(s) otherwise as needed.      Patient is to complete fasting CBC, CMP, lipid panel labs today. Will call patient with results when available.     Patient's physical form was filled out for her work and will be faxed to her work.     She is scheduled for her mammogram on 5/5.     Patient is to follow up with Dr. Mcgrath (GYN) as scheduled.     Patient will check with her insurance on the Hep C and HIV lab tests to see if they are covered.     Advised patient that if her heart palpitations and fatigue continue, she should let me know.

## 2023-05-03 NOTE — PROGRESS NOTES
"Subjective   Patient ID: Josephine Jamison is a 42 y.o. female who presents for Annual Exam. I last saw patient on 4/11/2022.     HPI   Needs paperwork and labs completed for work.      She has noticed more fatigue with exercising than she used to have. She admits to occasional heart palpitations.     Review of Systems  Except positives as noted in the CC & HPI      Constitutional: Denies fevers, chills, night sweats, fatigue, weight changes, change in appetite    Eyes: Denies blurry vision, double vision    ENT: Denies otalgia, trouble hearing, tinnitus, vertigo, nasal congestion, rhinorrhea, sore throat    Neck: Denies swelling, masses    Cardiovascular: Denies chest pain, palpitations, edema, orthopnea, syncope    Respiratory: Denies dyspnea, cough, wheezing, postural nocturnal dyspnea    Gastrointestinal: Denies abdominal pain, nausea, vomiting, diarrhea, constipation, melena, hematochezia    Genitourinary: Denies dysuria, hematuria, frequency, urgency    Musculoskeletal: Denies back pain, neck pain, arthralgias, myalgias    Integumentary: Denies skin lesions, rashes, masses    Neurological: Denies dizziness, headaches, confusion, limb weakness, paresthesias, syncope, convulsions    Psychiatric: Denies depression, anxiety, homicidal ideations, suicidal ideations, sleep disturbances    Endocrine: Denies polyphagia, polydipsia, polyuria, weakness, hair thinning, heat intolerance, cold intolerance, weight changes    Heme/Lymph: Denies easy bruising, easy bleeding, swollen glands     Objective   /72 (BP Location: Left arm, BP Cuff Size: Large adult)   Pulse 65   Temp 36.4 °C (97.5 °F)   Ht 1.575 m (5' 2\")   Wt 63.8 kg (140 lb 9.6 oz)   SpO2 98%   BMI 25.72 kg/m²     Physical Exam  Gen. Appearance - well-developed, well-nourished, 42 y.o., White female in no acute distress.     Skin - warm, pink and dry without rash or concerning lesions. Freckling across the upper back and chest. Nonpigmented skin tag on " mid back. Raised, pigmented nevi on mid chest.     Mental Status - alert and oriented times 3. Normal mood and affect appropriate to mood.     Ears - TMs shiny and move well with insufflation. Ear canals are clear bilaterally.     Neck - supple without lymphadenopathy. Carotid pulses are normal without bruits. Thyroid is normal in midline without nodules.    Chest - lungs are clear to auscultation without rales, rhonchi or wheezes.    Heart - regular, rate, and rhythm without murmurs, rubs or gallops.     Abdomen - soft, flat, nontender, nondistended. No masses, hepatomegaly or splenomegaly is noted. No rebound, rigidity or guarding is noted. Bowel sounds are normoactive.     Extremities - no cyanosis, clubbing or edema. Pedal pulses are 2+ normal at the dorsalis pedis and posterior tibial pulses bilaterally.     Neurological - cranial nerves II through XII are grossly intact. Motor strength 5/5 at all fours.     Assessment/Plan   1. Healthcare maintenance  CBC and Auto Differential    Comprehensive Metabolic Panel    Follow Up In [facility] - PCP      2. Hyperglycemia  Follow Up In [facility] - PCP      3. Fatigue, unspecified type  TSH with reflex to Free T4 if abnormal      4. Overweight with body mass index (BMI) of 25 to 25.9 in adult        5. Lipid screening  Lipid Panel      Patient to continue current medications (with any exceptions as noted) and diet. Follow-up in 12 month(s) otherwise as needed.      Patient is to complete fasting CBC, CMP, lipid panel, TSH labs today. Will call patient with results when available.     Patient's physical form was filled out for her work and will be faxed to her work.     She is scheduled for her mammogram on 5/5.     Patient is to follow up with Dr. Mcgrath (GYN) as scheduled.     Patient will check with her insurance on the Hep C and HIV lab tests to see if they are covered.     Advised patient that if her heart palpitations and fatigue become more  persistent, she should let me know. Discussed doing an EKG which she declined today.         Scribe Attestation  By signing my name below, I, Rahat Bentley   attest that this documentation has been prepared under the direction and in the presence of Darwin Aguilar MD.

## 2023-07-25 DIAGNOSIS — Z12.31 SCREENING MAMMOGRAM FOR BREAST CANCER: ICD-10-CM

## 2023-10-16 RX ORDER — DESOGESTREL AND ETHINYL ESTRADIOL 0.15-0.03
1 KIT ORAL DAILY
Qty: 84 TABLET | Refills: 0 | Status: SHIPPED | OUTPATIENT
Start: 2023-10-16

## 2023-11-02 ENCOUNTER — OFFICE VISIT (OUTPATIENT)
Dept: OBGYN CLINIC | Age: 43
End: 2023-11-02
Payer: COMMERCIAL

## 2023-11-02 VITALS
SYSTOLIC BLOOD PRESSURE: 112 MMHG | BODY MASS INDEX: 24.66 KG/M2 | HEIGHT: 62 IN | WEIGHT: 134 LBS | DIASTOLIC BLOOD PRESSURE: 60 MMHG

## 2023-11-02 DIAGNOSIS — Z01.419 ENCOUNTER FOR WELL WOMAN EXAM WITH ROUTINE GYNECOLOGICAL EXAM: Primary | ICD-10-CM

## 2023-11-02 DIAGNOSIS — Z12.31 SCREENING MAMMOGRAM FOR BREAST CANCER: ICD-10-CM

## 2023-11-02 PROCEDURE — G8484 FLU IMMUNIZE NO ADMIN: HCPCS | Performed by: OBSTETRICS & GYNECOLOGY

## 2023-11-02 PROCEDURE — 99396 PREV VISIT EST AGE 40-64: CPT | Performed by: OBSTETRICS & GYNECOLOGY

## 2023-11-02 RX ORDER — DESOGESTREL AND ETHINYL ESTRADIOL 0.15-0.03
1 KIT ORAL DAILY
Qty: 84 TABLET | Refills: 3 | Status: SHIPPED | OUTPATIENT
Start: 2023-11-02

## 2023-11-02 SDOH — ECONOMIC STABILITY: FOOD INSECURITY: WITHIN THE PAST 12 MONTHS, THE FOOD YOU BOUGHT JUST DIDN'T LAST AND YOU DIDN'T HAVE MONEY TO GET MORE.: NEVER TRUE

## 2023-11-02 SDOH — ECONOMIC STABILITY: INCOME INSECURITY: HOW HARD IS IT FOR YOU TO PAY FOR THE VERY BASICS LIKE FOOD, HOUSING, MEDICAL CARE, AND HEATING?: NOT HARD AT ALL

## 2023-11-02 SDOH — ECONOMIC STABILITY: FOOD INSECURITY: WITHIN THE PAST 12 MONTHS, YOU WORRIED THAT YOUR FOOD WOULD RUN OUT BEFORE YOU GOT MONEY TO BUY MORE.: NEVER TRUE

## 2023-11-02 SDOH — ECONOMIC STABILITY: HOUSING INSECURITY
IN THE LAST 12 MONTHS, WAS THERE A TIME WHEN YOU DID NOT HAVE A STEADY PLACE TO SLEEP OR SLEPT IN A SHELTER (INCLUDING NOW)?: NO

## 2023-11-02 ASSESSMENT — ENCOUNTER SYMPTOMS
ALLERGIC/IMMUNOLOGIC NEGATIVE: 1
VOMITING: 0
ABDOMINAL DISTENTION: 0
NAUSEA: 0
EYES NEGATIVE: 1
ABDOMINAL PAIN: 0
RESPIRATORY NEGATIVE: 1
CONSTIPATION: 0
RECTAL PAIN: 0
BLOOD IN STOOL: 0
ANAL BLEEDING: 0
DIARRHEA: 0

## 2023-11-02 ASSESSMENT — PATIENT HEALTH QUESTIONNAIRE - PHQ9
SUM OF ALL RESPONSES TO PHQ QUESTIONS 1-9: 0
SUM OF ALL RESPONSES TO PHQ9 QUESTIONS 1 & 2: 0
SUM OF ALL RESPONSES TO PHQ QUESTIONS 1-9: 0
SUM OF ALL RESPONSES TO PHQ QUESTIONS 1-9: 0
1. LITTLE INTEREST OR PLEASURE IN DOING THINGS: 0
SUM OF ALL RESPONSES TO PHQ QUESTIONS 1-9: 0
2. FEELING DOWN, DEPRESSED OR HOPELESS: 0

## 2023-11-02 ASSESSMENT — VISUAL ACUITY: OU: 1

## 2023-11-02 NOTE — PROGRESS NOTES
The patient was asked if she would like a chaperone present for her intimate exam. She  Declined the chaperone.  Everette Grullon CMA ([facility])
alert and oriented to person, place, and time. Psychiatric:         Behavior: Behavior normal.         Thought Content: Thought content normal.         Judgment: Judgment normal.         Assessment:      Diagnosis Orders   1. Encounter for well woman exam with routine gynecological exam        2. Screening mammogram for breast cancer  KAROL ARCHANA DIGITAL SCREEN BILATERAL            Plan:      Medications placedthis encounter:  Orders Placed This Encounter   Medications    desogestrel-ethinyl estradiol (APRI) 0.15-30 MG-MCG per tablet     Sig: Take 1 tablet by mouth daily     Dispense:  84 tablet     Refill:  3     Requesting 1 year supply         Orders placedthis encounter:  Orders Placed This Encounter   Procedures    KAROL ARCHANA DIGITAL SCREEN BILATERAL     Standing Status:   Future     Standing Expiration Date:   1/2/2025         Follow up:  Return in about 1 year (around 11/2/2024) for Annual.  Repeat Annual GYN exam every 1 year. Cervical Cytology exam starts age 24. If Negative Cytology;  follow-up screening per current guidelines. Mammograms yearly starting at age 36. Calcium and Vitamin D dosing reviewed ( age appropriate ). Colonoscopy and bone density screening discussed ( age appropriate ). Birth control and STD prevention discussed ( age appropriate ). Gardisil counseling completed for all patients ( age appropriate ). Routine health maintenance ( per PCP and guidelines ).

## 2024-08-04 NOTE — PROGRESS NOTES
Subjective:      Patient ID: Yessica Torres is a 38 y.o. female    Annual exam. Reviewed medical, surgical, social and family history. Also reviewed current medications and allergies. Normal cycles. Pap deferred ( negative co-testing 2021; next pap 2026 ). Screening mammogram ordered. STD screening offered. Monthly SBE encouraged. F/U annual or prn. Pt also wished to discuss vaginal discharge and odor extending her appointment time by 10 minutes. Patient states after last cycle she has noticed an abnormal discharge with foul fishy odor. Denies itching, burning or pelvic pain. Denies nre sexual partners. Last new partner 2/2022 and sx only recent. SSE with frothy vaginal discharge c/w BV. STI screening and wet prep performed. Rx:  Flagyl 500 mg po bid x 7 days. All questions answered. Vitals:  /70   Ht 5' 2\" (1.575 m)   Wt 133 lb (60.3 kg)   LMP 09/27/2022   BMI 24.33 kg/m²   History reviewed. No pertinent past medical history. History reviewed. No pertinent surgical history. Allergies:  Patient has no known allergies. Current Outpatient Medications   Medication Sig Dispense Refill    desogestrel-ethinyl estradiol (ENSKYCE) 0.15-30 MG-MCG per tablet Take 1 tablet by mouth daily 84 tablet 4     No current facility-administered medications for this visit. Social History     Socioeconomic History    Marital status: Single     Spouse name: Not on file    Number of children: Not on file    Years of education: Not on file    Highest education level: Not on file   Occupational History    Not on file   Tobacco Use    Smoking status: Former     Types: Cigarettes    Smokeless tobacco: Never   Substance and Sexual Activity    Alcohol use:  Yes     Alcohol/week: 0.0 standard drinks    Drug use: No    Sexual activity: Yes     Partners: Male     Birth control/protection: Pill     Comment: New partner 2019   Other Topics Concern    Not on file   Social History Narrative    Not on file     Social Determinants of Health     Financial Resource Strain: Not on file   Food Insecurity: Not on file   Transportation Needs: Not on file   Physical Activity: Not on file   Stress: Not on file   Social Connections: Not on file   Intimate Partner Violence: Not on file   Housing Stability: Not on file     Family History   Problem Relation Age of Onset    Heart Attack Father     Cancer Mother     Colon Cancer Neg Hx     Diabetes Neg Hx     Eclampsia Neg Hx     Hypertension Neg Hx     Ovarian Cancer Neg Hx      Labor Neg Hx     Spont Abortions Neg Hx     Stroke Neg Hx        Review of Systems   Constitutional: Negative. Negative for activity change, appetite change, chills, diaphoresis, fatigue, fever and unexpected weight change. HENT: Negative. Eyes: Negative. Respiratory: Negative. Cardiovascular: Negative. Gastrointestinal:  Negative for abdominal distention, abdominal pain, anal bleeding, blood in stool, constipation, diarrhea, nausea, rectal pain and vomiting. Endocrine: Negative. Genitourinary:  Positive for vaginal discharge. Negative for decreased urine volume, difficulty urinating, dyspareunia, dysuria, enuresis, flank pain, frequency, genital sores, hematuria, menstrual problem, pelvic pain, urgency, vaginal bleeding and vaginal pain. Musculoskeletal: Negative. Skin: Negative. Allergic/Immunologic: Negative. Neurological: Negative. Hematological: Negative. Psychiatric/Behavioral: Negative. Objective:     Physical Exam  Constitutional:       Appearance: She is well-developed. HENT:      Head: Normocephalic. Eyes:      General: Lids are normal. Vision grossly intact. Neck:      Thyroid: No thyromegaly. Cardiovascular:      Rate and Rhythm: Normal rate and regular rhythm. Heart sounds: Normal heart sounds. Pulmonary:      Effort: Pulmonary effort is normal. No respiratory distress. Breath sounds: Normal breath sounds.  No wheezing 98.9 or rales. Chest:      Chest wall: No tenderness. Breasts:     Right: Normal. No swelling, bleeding, inverted nipple, mass, nipple discharge, skin change or tenderness. Left: Normal. No swelling, bleeding, inverted nipple, mass, nipple discharge, skin change or tenderness. Abdominal:      General: There is no distension. Palpations: Abdomen is soft. There is no mass. Tenderness: There is no abdominal tenderness. There is no guarding or rebound. Hernia: No hernia is present. There is no hernia in the left inguinal area or right inguinal area. Genitourinary:     General: Normal vulva. Pubic Area: No rash. Labia:         Right: No rash, tenderness, lesion or injury. Left: No rash, tenderness, lesion or injury. Urethra: No prolapse, urethral swelling or urethral lesion. Vagina: Normal. No signs of injury and foreign body. No vaginal discharge, erythema, tenderness or bleeding. Cervix: Discharge present. No cervical motion tenderness or friability. Uterus: Not deviated, not enlarged, not fixed and not tender. Adnexa:         Right: No mass, tenderness or fullness. Left: No mass, tenderness or fullness. Rectum: Normal.   Musculoskeletal:         General: No tenderness. Normal range of motion. Cervical back: Normal range of motion and neck supple. Lymphadenopathy:      Cervical: No cervical adenopathy. Upper Body:      Right upper body: No supraclavicular or axillary adenopathy. Left upper body: No supraclavicular or axillary adenopathy. Lower Body: No right inguinal adenopathy. No left inguinal adenopathy. Skin:     General: Skin is warm and dry. Coloration: Skin is not pale. Findings: No erythema or rash. Neurological:      Mental Status: She is alert and oriented to person, place, and time. Psychiatric:         Behavior: Behavior normal.         Thought Content:  Thought content normal. Judgment: Judgment normal.       Assessment:      Diagnosis Orders   1. Encounter for well woman exam with routine gynecological exam        2. Screening mammogram for breast cancer  KAROL DIGITAL SCREEN W OR WO CAD BILATERAL      3. Vaginal discharge  Miscellaneous sendout 3      4. BV (bacterial vaginosis)  Miscellaneous sendout 3            Plan:      Medications placedthis encounter:  Orders Placed This Encounter   Medications    desogestrel-ethinyl estradiol (ENSKYCE) 0.15-30 MG-MCG per tablet     Sig: Take 1 tablet by mouth daily     Dispense:  84 tablet     Refill:  4     Last refill until patient comes in for appointment. Orders placedthis encounter:  Orders Placed This Encounter   Procedures    KAROL DIGITAL SCREEN W OR WO CAD BILATERAL     Standing Status:   Future     Standing Expiration Date:   10/20/2023    Miscellaneous sendout 3     Standing Status:   Future     Standing Expiration Date:   10/20/2023     Order Specific Question:   Specify Req. Test (1 Test/Order)     Answer:   BV & yeast         Follow up:  Return in about 1 year (around 10/20/2023) for Annual.  Repeat Annual GYN exam every 1 year. Cervical Cytology exam starts age 24. If Negative Cytology;  follow-up screening per current guidelines. Mammograms yearly starting at age 36. Calcium and Vitamin D dosing reviewed ( age appropriate ). Colonoscopy and bone density screening discussed ( age appropriate ). Birth control and STD prevention discussed ( age appropriate ). Gardisil counseling completed for all patients ( age appropriate ). Routine health maintenance ( per PCP and guidelines ).

## 2024-11-08 ENCOUNTER — OFFICE VISIT (OUTPATIENT)
Dept: OBGYN CLINIC | Age: 44
End: 2024-11-08
Payer: COMMERCIAL

## 2024-11-08 VITALS
SYSTOLIC BLOOD PRESSURE: 114 MMHG | HEART RATE: 64 BPM | DIASTOLIC BLOOD PRESSURE: 64 MMHG | WEIGHT: 136 LBS | BODY MASS INDEX: 24.87 KG/M2

## 2024-11-08 DIAGNOSIS — Z11.51 SCREENING FOR HUMAN PAPILLOMAVIRUS: ICD-10-CM

## 2024-11-08 DIAGNOSIS — Z12.31 SCREENING MAMMOGRAM FOR BREAST CANCER: ICD-10-CM

## 2024-11-08 DIAGNOSIS — Z01.419 WOMEN'S ANNUAL ROUTINE GYNECOLOGICAL EXAMINATION: Primary | ICD-10-CM

## 2024-11-08 DIAGNOSIS — N94.6 DYSMENORRHEA: ICD-10-CM

## 2024-11-08 PROCEDURE — G8484 FLU IMMUNIZE NO ADMIN: HCPCS | Performed by: ADVANCED PRACTICE MIDWIFE

## 2024-11-08 PROCEDURE — 99396 PREV VISIT EST AGE 40-64: CPT | Performed by: ADVANCED PRACTICE MIDWIFE

## 2024-11-08 RX ORDER — DESOGESTREL AND ETHINYL ESTRADIOL 0.15-0.03
1 KIT ORAL DAILY
Qty: 90 TABLET | Refills: 3 | Status: SHIPPED | OUTPATIENT
Start: 2024-11-08 | End: 2025-11-08

## 2024-11-08 ASSESSMENT — ENCOUNTER SYMPTOMS
NAUSEA: 0
ABDOMINAL PAIN: 0
SHORTNESS OF BREATH: 0
RHINORRHEA: 0
COUGH: 0
SORE THROAT: 0
VOICE CHANGE: 0
CONSTIPATION: 0
VOMITING: 0
DIARRHEA: 0
TROUBLE SWALLOWING: 0

## 2024-11-15 LAB
HPV HR 12 DNA SPEC QL NAA+PROBE: NOT DETECTED
HPV16 DNA SPEC QL NAA+PROBE: NOT DETECTED
HPV16+18+H RISK 12 DNA SPEC-IMP: NORMAL
HPV18 DNA SPEC QL NAA+PROBE: NOT DETECTED

## 2025-05-07 ENCOUNTER — APPOINTMENT (OUTPATIENT)
Dept: PRIMARY CARE | Facility: CLINIC | Age: 45
End: 2025-05-07
Payer: COMMERCIAL

## 2025-05-13 PROBLEM — S86.112A: Status: ACTIVE | Noted: 2020-11-20

## 2025-05-13 NOTE — PROGRESS NOTES
Subjective   Patient ID: Josephine Jamison is a 44 y.o. female who presents for Abdominal Pain.    HPI entered by Medical Assistant and reviewed/updated by myself.    Patient presents in the office today to establish care. Patient was seeing Dr. Aguilar. Patient does not go to him anymore due to him being male.     Patient presents in office for RUQ pain. Ongoing x 1 month since 4/19/25, sudden onset.   Has been waxing and waning, worsens with activity or coughing/increased work of breathing  No fall/injury  Described as an aching shooting, stabbing  Worsens with twisting and cutting the lawn (both riding and push mower)  Does not radiate   Does not improve/worsen around meals  No GERD, no N/V  Pain is a 5-6/10  Has tried Norco left over from previous dental work, helped    Also took Aleve, and used heat,some help  No constipation or urinary issues     Mammogram done 6/19/24-- done through ob/gyn at [facility] (Dr. Mcgrath), has next one scheduled for June/July at [facility].  Did at home fit test provided by work. Patient states this was negative.  She will send me the results.  Mom- H/o Ovarian cancer- pt last pap 11/08/2024    The patient's allergies, medications, and history were reviewed with them today and updated as indicated.    Review of Systems   Constitutional:  Negative for activity change, appetite change, chills, diaphoresis, fatigue, fever and unexpected weight change.   Respiratory:  Negative for cough, chest tightness, shortness of breath and wheezing.    Cardiovascular:  Negative for chest pain, palpitations and leg swelling.   Gastrointestinal:  Positive for abdominal pain (right upper abdomen/thoracic). Negative for abdominal distention, blood in stool, constipation, diarrhea, nausea, rectal pain and vomiting.   Musculoskeletal:  Negative for back pain, myalgias and neck pain.   Skin:  Negative for rash and wound.   Allergic/Immunologic: Negative for environmental allergies.   Neurological:  Negative for  "dizziness, syncope, facial asymmetry and headaches.   Psychiatric/Behavioral:  Negative for confusion. The patient is not nervous/anxious.       Objective   Vital Signs: /72   Pulse 71   Temp 36.3 °C (97.3 °F) (Temporal)   Ht 1.575 m (5' 2\")   Wt 59.5 kg (131 lb 3.2 oz)   SpO2 98%   BMI 24.00 kg/m²     Physical Exam  Vitals and nursing note reviewed.   Constitutional:       General: She is not in acute distress.     Appearance: Normal appearance. She is not ill-appearing.   HENT:      Head: Normocephalic and atraumatic.      Right Ear: Tympanic membrane, ear canal and external ear normal. No drainage or swelling. No middle ear effusion. No mastoid tenderness. Tympanic membrane is not erythematous, retracted or bulging.      Left Ear: Tympanic membrane, ear canal and external ear normal. No drainage or swelling.  No middle ear effusion. No mastoid tenderness. Tympanic membrane is not erythematous, retracted or bulging.      Nose: No congestion or rhinorrhea.      Right Sinus: No maxillary sinus tenderness or frontal sinus tenderness.      Left Sinus: No maxillary sinus tenderness or frontal sinus tenderness.      Mouth/Throat:      Mouth: Mucous membranes are moist.      Tongue: No lesions.      Palate: No mass and lesions.      Pharynx: Uvula midline. No oropharyngeal exudate, posterior oropharyngeal erythema, uvula swelling or postnasal drip.      Tonsils: No tonsillar exudate or tonsillar abscesses.   Eyes:      General:         Right eye: No discharge.         Left eye: No discharge.      Extraocular Movements: Extraocular movements intact.      Conjunctiva/sclera: Conjunctivae normal.      Pupils: Pupils are equal, round, and reactive to light.   Cardiovascular:      Rate and Rhythm: Normal rate and regular rhythm.      Heart sounds: No murmur heard.  Pulmonary:      Effort: Pulmonary effort is normal. No respiratory distress.      Breath sounds: Normal breath sounds and air entry.   Abdominal:      " General: Abdomen is flat.      Palpations: Abdomen is soft.      Tenderness: There is no abdominal tenderness. There is no guarding or rebound. Negative signs include Cotton's sign, Rovsing's sign, McBurney's sign and psoas sign.          Comments: Area of pain reproducible with movement such as torso flexion/extension.  Not reproducible to palpation.   Musculoskeletal:      Right lower leg: No edema.      Left lower leg: No edema.   Lymphadenopathy:      Cervical: No cervical adenopathy.   Skin:     General: Skin is warm and dry.      Coloration: Skin is not jaundiced.      Findings: No erythema or rash.   Neurological:      General: No focal deficit present.      Mental Status: She is alert. Mental status is at baseline.   Psychiatric:         Mood and Affect: Mood normal.         Behavior: Behavior normal.         Thought Content: Thought content normal.     POCT today: No results found for this visit on 05/14/25.     Assessment & Plan  Diagnoses and all orders for this visit:    Encounter to establish care  First visit to [facility]. Reviewed usual process for reviewing results, follow up care, scheduling sick visits as needed. I am in the office Tuesday-Friday most weeks. MyChart or calling the office directly at [phone], option 2, are the best ways to reach me and/or schedule appointments.     Routine general medical examination at a health care facility  Encounter for screening for lipid disorder  Health maintenance reviewed and updated.  Will send me results from her fit test done this year, and her blood work done last year.  States these are both provided by her employer [employer].  She is agreeable to repeat/perform through  lab if needed, otherwise she already has an at-home kit to perform same blood work again for 2025.  Mammogram ordered by her GYN and scheduled for July 2025 at [facility].  -     Comprehensive Metabolic Panel; Future  -     CBC and Auto Differential; Future  -      Lipid Panel; Future    Muscle strain of chest wall, initial encounter  Right lateral thoracic pain x 4 weeks, slowly improving but exacerbated with activity such as cutting the grass.  Negative abdominal exam today.  Will treat with Medrol as per below.  Slowly resume normal activity as tolerated.  Let me know if symptoms fail to resolve to baseline.  -     methylPREDNISolone (Medrol Dospak) 4 mg tablets; Take as directed on package.    Reviewed/discussed treatment options and health maintenance. Take medications as prescribed. We will contact you with the results of any ordered testing; please send a [employer] message or call the office if you do not hear from us. Follow up in the office with me in 6 months and as needed. Return sooner if symptoms do not resolve as expected.     Amanda Morales, APRN-CNP, DNP  Family Nurse Practitioner  [facility]

## 2025-05-14 ENCOUNTER — APPOINTMENT (OUTPATIENT)
Dept: PRIMARY CARE | Facility: CLINIC | Age: 45
End: 2025-05-14
Payer: COMMERCIAL

## 2025-05-14 VITALS
TEMPERATURE: 97.3 F | SYSTOLIC BLOOD PRESSURE: 100 MMHG | HEIGHT: 62 IN | DIASTOLIC BLOOD PRESSURE: 72 MMHG | HEART RATE: 71 BPM | WEIGHT: 131.2 LBS | OXYGEN SATURATION: 98 % | BODY MASS INDEX: 24.14 KG/M2

## 2025-05-14 DIAGNOSIS — S29.011A MUSCLE STRAIN OF CHEST WALL, INITIAL ENCOUNTER: ICD-10-CM

## 2025-05-14 DIAGNOSIS — Z76.89 ENCOUNTER TO ESTABLISH CARE: Primary | ICD-10-CM

## 2025-05-14 DIAGNOSIS — Z00.00 ROUTINE GENERAL MEDICAL EXAMINATION AT A HEALTH CARE FACILITY: ICD-10-CM

## 2025-05-14 DIAGNOSIS — Z13.220 ENCOUNTER FOR SCREENING FOR LIPID DISORDER: ICD-10-CM

## 2025-05-14 PROBLEM — R73.9 HYPERGLYCEMIA: Status: RESOLVED | Noted: 2023-05-03 | Resolved: 2025-05-14

## 2025-05-14 PROBLEM — M20.5X2: Status: RESOLVED | Noted: 2018-09-12 | Resolved: 2025-05-14

## 2025-05-14 PROBLEM — S86.112A: Status: RESOLVED | Noted: 2020-11-20 | Resolved: 2025-05-14

## 2025-05-14 PROBLEM — E66.3 OVERWEIGHT WITH BODY MASS INDEX (BMI) OF 25 TO 25.9 IN ADULT: Status: RESOLVED | Noted: 2023-05-03 | Resolved: 2025-05-14

## 2025-05-14 PROBLEM — L81.2 FRECKLED SKIN: Status: RESOLVED | Noted: 2023-05-03 | Resolved: 2025-05-14

## 2025-05-14 PROCEDURE — 1036F TOBACCO NON-USER: CPT | Performed by: NURSE PRACTITIONER

## 2025-05-14 PROCEDURE — 99396 PREV VISIT EST AGE 40-64: CPT | Performed by: NURSE PRACTITIONER

## 2025-05-14 PROCEDURE — 3008F BODY MASS INDEX DOCD: CPT | Performed by: NURSE PRACTITIONER

## 2025-05-14 RX ORDER — FLUCONAZOLE 150 MG/1
150 TABLET ORAL ONCE
COMMUNITY
Start: 2025-05-13

## 2025-05-14 RX ORDER — AMOXICILLIN 875 MG/1
875 TABLET, FILM COATED ORAL DAILY
COMMUNITY
Start: 2025-05-13 | End: 2025-05-21

## 2025-05-14 RX ORDER — METHYLPREDNISOLONE 4 MG/1
TABLET ORAL
Qty: 21 TABLET | Refills: 0 | Status: SHIPPED | OUTPATIENT
Start: 2025-05-14 | End: 2025-05-20

## 2025-05-14 ASSESSMENT — ENCOUNTER SYMPTOMS
BACK PAIN: 0
FATIGUE: 0
NERVOUS/ANXIOUS: 0
COUGH: 0
VOMITING: 0
RECTAL PAIN: 0
APPETITE CHANGE: 0
FEVER: 0
NAUSEA: 0
CONSTIPATION: 0
DIZZINESS: 0
ABDOMINAL PAIN: 1
WHEEZING: 0
NECK PAIN: 0
WOUND: 0
CHEST TIGHTNESS: 0
UNEXPECTED WEIGHT CHANGE: 0
SHORTNESS OF BREATH: 0
DIARRHEA: 0
ABDOMINAL DISTENTION: 0
BLOOD IN STOOL: 0
CHILLS: 0
CONFUSION: 0
DIAPHORESIS: 0
PALPITATIONS: 0
FACIAL ASYMMETRY: 0
MYALGIAS: 0
HEADACHES: 0
ACTIVITY CHANGE: 0

## 2025-07-31 ENCOUNTER — TELEPHONE (OUTPATIENT)
Dept: OBGYN CLINIC | Age: 45
End: 2025-07-31

## 2025-07-31 DIAGNOSIS — R92.8 ABNORMAL MAMMOGRAM OF LEFT BREAST: Primary | ICD-10-CM

## 2025-08-20 ENCOUNTER — HOSPITAL ENCOUNTER (OUTPATIENT)
Dept: ULTRASOUND IMAGING | Age: 45
Discharge: HOME | End: 2025-08-22
Payer: COMMERCIAL

## 2025-08-20 ENCOUNTER — HOSPITAL ENCOUNTER (OUTPATIENT)
Dept: WOMENS IMAGING | Age: 45
Discharge: HOME | End: 2025-08-22
Payer: COMMERCIAL

## 2025-08-20 VITALS — HEIGHT: 62 IN | BODY MASS INDEX: 24.87 KG/M2

## 2025-08-20 DIAGNOSIS — R92.8 ABNORMAL MAMMOGRAM OF LEFT BREAST: ICD-10-CM

## 2025-08-20 PROCEDURE — G0279 TOMOSYNTHESIS, MAMMO: HCPCS
